# Patient Record
Sex: FEMALE | Race: WHITE
[De-identification: names, ages, dates, MRNs, and addresses within clinical notes are randomized per-mention and may not be internally consistent; named-entity substitution may affect disease eponyms.]

---

## 2019-10-26 ENCOUNTER — HOSPITAL ENCOUNTER (EMERGENCY)
Dept: HOSPITAL 25 - UCEAST | Age: 24
Discharge: HOME | End: 2019-10-26
Payer: COMMERCIAL

## 2019-10-26 VITALS — SYSTOLIC BLOOD PRESSURE: 109 MMHG | DIASTOLIC BLOOD PRESSURE: 55 MMHG

## 2019-10-26 DIAGNOSIS — Z11.3: Primary | ICD-10-CM

## 2019-10-26 LAB — HBV SURFACE AB SER-ACNC: (no result) M[IU]/ML

## 2019-10-26 PROCEDURE — 86780 TREPONEMA PALLIDUM: CPT

## 2019-10-26 PROCEDURE — G0463 HOSPITAL OUTPT CLINIC VISIT: HCPCS

## 2019-10-26 PROCEDURE — 87591 N.GONORRHOEAE DNA AMP PROB: CPT

## 2019-10-26 PROCEDURE — 87389 HIV-1 AG W/HIV-1&-2 AB AG IA: CPT

## 2019-10-26 PROCEDURE — 87491 CHLMYD TRACH DNA AMP PROBE: CPT

## 2019-10-26 PROCEDURE — 86803 HEPATITIS C AB TEST: CPT

## 2019-10-26 PROCEDURE — 86706 HEP B SURFACE ANTIBODY: CPT

## 2019-10-26 PROCEDURE — 36415 COLL VENOUS BLD VENIPUNCTURE: CPT

## 2019-10-26 PROCEDURE — 99211 OFF/OP EST MAY X REQ PHY/QHP: CPT

## 2019-10-26 PROCEDURE — 87661 TRICHOMONAS VAGINALIS AMPLIF: CPT

## 2019-10-26 NOTE — XMS REPORT
Continuity of Care Document (CCD)

 Created on:October 15, 2019



Patient:Chiquita Gonzalez

Sex:Female

:1995

External Reference #:[id number]





Demographics







 Address  [address] [address]

 

 Mobile Phone  [phone]

 

 Work Phone  [phone]

 

 Email Address  [email or web address]

 

 Preferred Language  en

 

 Marital Status  Not  or 

 

 Confucianism Affiliation  Unknown

 

 Race  White

 

 Ethnic Group  Not  or 









Author







 Name  Goldie Shepherd M.D.

 

 Address  [address]



   Unavailable



   [place] [address]









Care Team Providers







 Name  Role  Phone

 

 Goldie Shepherd M.D. - Family Medicine  Care Team Information   
Unavailable









Problems







 Active Problems  Provider  Date

 

 Moderate major depression, single episode  Goldie Shepherd M.D.  Onset: 10/23/
2017







Social History







 Type  Date  Description  Comments

 

 Birth Sex    Unknown  

 

 Tobacco Use  Start: Unknown  Never Smoked Cigarettes  

 

 ETOH Use    Occasional  1-2 2-3x week

 

 Exercise Type/Frequency    Exercises regularly  cardio 3x week







Allergies, Adverse Reactions, Alerts







 Description

 

 No Known Drug Allergies







Medications







 Active Medications  SIG  Qnty  Indications  Ordering Provider  Date

 

 Lorazepam  take 1/2 - 1  14tabs  F40.243  Goldie Shepherd,  10/15/2019



         0.5mg Tablets  tablet by mouth      M.D.  



   up to twice daily        



   as needed for        



   anxiety        

 

 Sprintec 28  1 by mouth every      Unknown  



   day        



 0.25-35mg-mcg Tablets          



           







Medications Administered in Office







 Medication  SIG  Qnty  Indications  Ordering Provider  Date

 

 Brief Emotional/Behav        PA Alvarado  2018



 Assessment W/ Scoring Doc Per          



 Standard Inst          



     Injection          







Immunizations







 CPT Code  Status  Date  Vaccine  Lot #

 

 [id number]  Given  10/15/2019  Influenza Vac, Quadrivalent, Slit Virus, Im  [id number]

 

 57770  Given  2018  Influenza Vac, Quadrivalent, Slit Virus, Im  







Vital Signs







 Date  Vital  Result  Comment

 

 10/15/2019  1:28pm  BP Systolic  120 mmHg  









 BP Diastolic  60 mmHg  

 

 Heart Rate  80 /min  

 

 Body Temperature  98.6 F  

 

 Respiratory Rate  12 /min  

 

 Height  67 inches  5'7"

 

 Weight  196.00 lb  

 

 BMI (Body Mass Index)  30.7 kg/m2  









 2018  5:53pm  BP Systolic  118 mmHg  









 BP Diastolic  60 mmHg  

 

 Heart Rate  78 /min  

 

 Body Temperature  98.4 F  

 

 Respiratory Rate  16 /min  

 

 Height  67 inches  5'7"

 

 Weight  205.12 lb  

 

 BMI (Body Mass Index)  32.1 kg/m2  







Results







 Description

 

 No Information Available







Procedures







 Description

 

 No Information Available







Medical Devices







 Description

 

 No Information Available







Encounters







 Description

 

 No Information Available







Assessments







 Date  Code  Description  Provider

 

 10/15/2019  Z23  Encounter for immunization  Goldie Shepherd M.D.

 

 10/15/2019  F41.9  Anxiety disorder, unspecified  Goldie Shepherd M.D.

 

 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







Plan of Treatment

Future Appointment(s):2019  6:00 pm - Goldie Shepherd M.D. at [facility]
15/2019 - Goldie Shepherd M.D.Z23 Encounter for wfzitedlrxqwT85.9 Anxiety disorder
, unspecifiedComments:discussed controlled substance, No alcohol or driving 
while on these medications. Controlled substances can cause physiologic 
dependence, withdrawal symptoms, be addictive and/or habit forming.  Not safe 
in dvhdjcjbqS64.243 Fear of flyingNew Medication:Lorazepam 0.5 mg - take 1/2 - 
1 tablet by mouth up to twice daily as needed for anxietyComments:Reviewed 
adverse side effects of medication. Advised to call the office if experiencing 
symptoms. Patient verbalized understanding. if using more often will need to 
consider restart fluoxetineAllComments:Medication Management Patient 
Understands medications she's taking?     Yes    No  Are there Barriers to 
Adherence?    Yes    No  Has the patient been asked about herbal supplements 
and therapies, and OTC  meds?      Yes    No



Functional Status







 Description

 

 No Information Available







Mental Status







 Description

 

 No Information Available







Referrals







 Description

 

 No Information Available

## 2019-10-26 NOTE — UC
General HPI





- HPI Summary


HPI Summary: 





Pt presents requesting STD testing. Pt is sexually active- uses condoms


Pt states 2 partners last year - pt staes gets tested annually - states [facility] 

[facility]. no sx. no 

vaginal discharge, itching, odor  no concerning sx with partners 


pt requesting testing for gc/ch/trich/hiv/hep/syphillis  pt declined testing 

for herpes  no h/o, no lesions, no dysuria


no personal hx of STD


BCP


meds reviewed





- History of Current Complaint


Chief Complaint: UCSTDScreening


Stated Complaint: PERSONAL


Time Seen by Provider: 10/26/19 10:49


Hx Obtained From: Patient


Hx Last Menstrual Period: 9/26/19


Current Severity: None


Pain Intensity: 0





- Allergy/Home Medications


Allergies/Adverse Reactions: 


 Allergies











Allergy/AdvReac Type Severity Reaction Status Date / Time


 


No Known Allergies Allergy   Verified 10/26/19 10:47











Home Medications: 


 Home Medications





Norgestimate-Ethinyl Estradiol [Norg-Ee 0.18-0.215-0.25/0.025] 1 tab PO DAILY 10

/26/19 [History Confirmed 10/26/19]











PMH/Surg Hx/FS Hx/Imm Hx


Previously Healthy: Yes





- Surgical History


Surgical History: None





- Family History


Known Family History: Positive: Non-Contributory





- Social History


Occupation: Employed Full-time


Lives: With Family


Alcohol Use: Weekly


Substance Use Type: None


Smoking Status (MU): Never Smoked Tobacco





Review of Systems


All Other Systems Reviewed And Are Negative: Yes


Gastrointestinal: Positive: Negative


Genitourinary: Positive: Negative





Physical Exam





- Summary


Physical Exam Summary: 





 Vital Signs Reviewed: Yes


A+Ox3, no distress


Eyes: Conjunctiva Clear, KALYAN. EOM intact and full


ENT: Hearing grossly normal  TM x 2 clear, mmoist, uvula midline, no exudate, 

no erythema


Neck: Positive: Supple


Respiratory: Positive: No respiratory distress, No accessory muscle use + CTA 

throughout  no w/r


Cardiovascular: RRR  nl s1, s2  no m/r  CBT <2  sec


abd soft + BS nt/nd  no guarding, no distension


Musculoskeletal Exam: JEROME x 4 without difficulty Strength Intact, ROM Intact


Neurological: Positive: Alert,  + sensation throughout


Psychological: Positive: Normal Response To evaluator


Skin: Positive: no rash, no ecchymosis


Triage Information Reviewed: Yes


Vital Signs: 


 Initial Vital Signs











Temp  98.5 F   10/26/19 10:43


 


Pulse  93   10/26/19 10:43


 


Resp  16   10/26/19 10:43


 


BP  89/60   10/26/19 10:43


 


Pulse Ox  100   10/26/19 10:43














Course/Dx





- Course


Course Of Treatment: 





Pt requesting STD testing


no sx


no concerns re partners


uses condoms


revoewed with pt tests


reviewed with pt  lag in results from exposure





- Diagnoses


Provider Diagnosis: 


 Routine screening for STI (sexually transmitted infection)








Discharge ED





- Sign-Out/Discharge


Documenting (check all that apply): Patient Departure


All imaging exams completed and their final reports reviewed: No Studies





- Discharge Plan


Condition: Stable


Disposition: HOME


Patient Education Materials:  Sexually Transmitted Diseases (ED), Safe Sex (ED)


Referrals: 


Goldie Shepherd MD [Primary Care Provider] - 


Additional Instructions: 


As discussed - your urine and blood has been send for the following sexually 

transmitted infections: Gonorrhea, Chlamydia, Trichamonas, HIV, Hepatitis, 

Syphillis. You declined testing for herpes simplex virus.  As discussed, these 

results may take up to 1 week to come back.  You will receive a call from a 

care team member if there are concerning results.  As discussed, HIV and 

Hepatitis may take several months to become positive in your blood work - you 

have not indicated any know exposures or symptoms





[facility], or return with questions or 

concerns








- Billing Disposition and Condition


Condition: STABLE


Disposition: Home

## 2019-10-27 NOTE — UC
- Progress Note


Progress Note: 





Follow up screening for STI's. 


Please notify that she is hep C andd HIV negative, but does not show immunity 

to hep B. 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 

considered (there recommendation is complicated--essentiall would re-vaccinate 

a health care worker in a high risk area.). 





Course/Dx





- Diagnoses


Provider Diagnoses: 


 Routine screening for STI (sexually transmitted infection)








Discharge ED





- Sign-Out/Discharge


Documenting (check all that apply): Post-Discharge Follow Up


All imaging exams completed and their final reports reviewed: No Studies





- Discharge Plan


Condition: Stable


Disposition: HOME


Patient Education Materials:  Sexually Transmitted Diseases (ED), Safe Sex (ED)


Referrals: 


Goldie Shepherd MD [Primary Care Provider] - 


Additional Instructions: 


As discussed - your urine and blood has been send for the following sexually 

transmitted infections: Gonorrhea, Chlamydia, Trichamonas, HIV, Hepatitis, 

Syphillis. You declined testing for herpes simplex virus.  As discussed, these 

results may take up to 1 week to come back.  You will receive a call from a 

care team member if there are concerning results.  As discussed, HIV and 

Hepatitis may take several months to become positive in your blood work - you 

have not indicated any know exposures or symptoms





[facility], or return with questions or 

concerns








- Billing Disposition and Condition


Condition: STABLE


Disposition: Home

## 2019-10-28 NOTE — UC
- Progress Note


Progress Note: 





Urine for GC and chlamydia returned negative.  Nursing staff will call and 

inform the patient.





Course/Dx





- Diagnoses


Provider Diagnoses: 


 Routine screening for STI (sexually transmitted infection)








Discharge ED





- Sign-Out/Discharge


Documenting (check all that apply): Post-Discharge Follow Up


All imaging exams completed and their final reports reviewed: No Studies





- Discharge Plan


Condition: Stable


Disposition: HOME


Patient Education Materials:  Sexually Transmitted Diseases (ED), Safe Sex (ED)


Referrals: 


Goldie Shepherd MD [Primary Care Provider] - 


Additional Instructions: 


As discussed - your urine and blood has been send for the following sexually 

transmitted infections: Gonorrhea, Chlamydia, Trichamonas, HIV, Hepatitis, 

Syphillis. You declined testing for herpes simplex virus.  As discussed, these 

results may take up to 1 week to come back.  You will receive a call from a 

care team member if there are concerning results.  As discussed, HIV and 

Hepatitis may take several months to become positive in your blood work - you 

have not indicated any know exposures or symptoms





[facility], or return with questions or 

concerns








- Billing Disposition and Condition


Condition: STABLE


Disposition: Home





- Attestation Statements


Provider Attestation: 





I was available for consult. This patient was seen by the [facility]. The patient was 

not presented to, seen by, or examined by me. -Yessenia

## 2019-10-31 NOTE — UC
- Progress Note


Progress Note: 





Please advise that syphilis screening is negative. 





Course/Dx





- Diagnoses


Provider Diagnoses: 


 Routine screening for STI (sexually transmitted infection)








Discharge ED





- Sign-Out/Discharge


Documenting (check all that apply): Post-Discharge Follow Up


All imaging exams completed and their final reports reviewed: No Studies





- Discharge Plan


Condition: Stable


Disposition: HOME


Patient Education Materials:  Sexually Transmitted Diseases (ED), Safe Sex (ED)


Referrals: 


Goldie Shepherd MD [Primary Care Provider] - 


Additional Instructions: 


As discussed - your urine and blood has been send for the following sexually 

transmitted infections: Gonorrhea, Chlamydia, Trichamonas, HIV, Hepatitis, 

Syphillis. You declined testing for herpes simplex virus.  As discussed, these 

results may take up to 1 week to come back.  You will receive a call from a 

care team member if there are concerning results.  As discussed, HIV and 

Hepatitis may take several months to become positive in your blood work - you 

have not indicated any know exposures or symptoms





[facility], or return with questions or 

concerns








- Billing Disposition and Condition


Condition: STABLE


Disposition: Home